# Patient Record
Sex: FEMALE | Race: WHITE | NOT HISPANIC OR LATINO | Employment: FULL TIME | URBAN - NONMETROPOLITAN AREA
[De-identification: names, ages, dates, MRNs, and addresses within clinical notes are randomized per-mention and may not be internally consistent; named-entity substitution may affect disease eponyms.]

---

## 2023-11-08 ENCOUNTER — OFFICE VISIT (OUTPATIENT)
Dept: OBSTETRICS AND GYNECOLOGY | Facility: CLINIC | Age: 23
End: 2023-11-08
Payer: COMMERCIAL

## 2023-11-08 VITALS
HEIGHT: 65 IN | TEMPERATURE: 98 F | SYSTOLIC BLOOD PRESSURE: 110 MMHG | DIASTOLIC BLOOD PRESSURE: 58 MMHG | BODY MASS INDEX: 23.25 KG/M2 | WEIGHT: 139.56 LBS

## 2023-11-08 DIAGNOSIS — O03.9 COMPLETE ABORTION: ICD-10-CM

## 2023-11-08 DIAGNOSIS — Z12.4 CERVICAL CANCER SCREENING: ICD-10-CM

## 2023-11-08 DIAGNOSIS — Z01.419 WELL WOMAN EXAM: Primary | ICD-10-CM

## 2023-11-08 DIAGNOSIS — Z30.9 ENCOUNTER FOR CONTRACEPTIVE MANAGEMENT, UNSPECIFIED TYPE: ICD-10-CM

## 2023-11-08 DIAGNOSIS — N91.5 OLIGOMENORRHEA, UNSPECIFIED TYPE: ICD-10-CM

## 2023-11-08 LAB
B-HCG UR QL: NEGATIVE
CTP QC/QA: YES

## 2023-11-08 PROCEDURE — 1159F PR MEDICATION LIST DOCUMENTED IN MEDICAL RECORD: ICD-10-PCS | Mod: CPTII,,, | Performed by: OBSTETRICS & GYNECOLOGY

## 2023-11-08 PROCEDURE — 3078F PR MOST RECENT DIASTOLIC BLOOD PRESSURE < 80 MM HG: ICD-10-PCS | Mod: CPTII,,, | Performed by: OBSTETRICS & GYNECOLOGY

## 2023-11-08 PROCEDURE — 3078F DIAST BP <80 MM HG: CPT | Mod: CPTII,,, | Performed by: OBSTETRICS & GYNECOLOGY

## 2023-11-08 PROCEDURE — 1159F MED LIST DOCD IN RCRD: CPT | Mod: CPTII,,, | Performed by: OBSTETRICS & GYNECOLOGY

## 2023-11-08 PROCEDURE — 81025 URINE PREGNANCY TEST: CPT | Mod: ,,, | Performed by: OBSTETRICS & GYNECOLOGY

## 2023-11-08 PROCEDURE — 3008F BODY MASS INDEX DOCD: CPT | Mod: CPTII,,, | Performed by: OBSTETRICS & GYNECOLOGY

## 2023-11-08 PROCEDURE — 3008F PR BODY MASS INDEX (BMI) DOCUMENTED: ICD-10-PCS | Mod: CPTII,,, | Performed by: OBSTETRICS & GYNECOLOGY

## 2023-11-08 PROCEDURE — 3074F PR MOST RECENT SYSTOLIC BLOOD PRESSURE < 130 MM HG: ICD-10-PCS | Mod: CPTII,,, | Performed by: OBSTETRICS & GYNECOLOGY

## 2023-11-08 PROCEDURE — 99385 PR PREVENTIVE VISIT,NEW,18-39: ICD-10-PCS | Mod: ,,, | Performed by: OBSTETRICS & GYNECOLOGY

## 2023-11-08 PROCEDURE — 3074F SYST BP LT 130 MM HG: CPT | Mod: CPTII,,, | Performed by: OBSTETRICS & GYNECOLOGY

## 2023-11-08 PROCEDURE — 99385 PREV VISIT NEW AGE 18-39: CPT | Mod: ,,, | Performed by: OBSTETRICS & GYNECOLOGY

## 2023-11-08 PROCEDURE — 81025 POCT URINE PREGNANCY: ICD-10-PCS | Mod: ,,, | Performed by: OBSTETRICS & GYNECOLOGY

## 2023-11-14 LAB — PSYCHE PATHOLOGY RESULT: NORMAL

## 2023-11-15 NOTE — PROGRESS NOTES
Pt notified of unsatisfactory PAP results, mostly blood, c neg GC/CT/TV. Pt advised per KB to come back in for a re-pap in a month. Advised that if on cycle when scheduled to call office to reschedule for after completion of cycle.  Pt verbalized understanding. Transferred to  staff to schedule appt.

## 2024-01-11 ENCOUNTER — TELEPHONE (OUTPATIENT)
Dept: OBSTETRICS AND GYNECOLOGY | Facility: CLINIC | Age: 24
End: 2024-01-11
Payer: COMMERCIAL

## 2024-01-11 NOTE — TELEPHONE ENCOUNTER
----- Message from Natalio Carrasquillo sent at 1/11/2024  1:20 PM CST -----  Contact: montserrat  Patient stated that Dr. Nancy Worley is requesting a records release form. Please fax over too [phone].       Thanks  DD

## 2024-03-04 ENCOUNTER — TELEPHONE (OUTPATIENT)
Dept: OBSTETRICS AND GYNECOLOGY | Facility: CLINIC | Age: 24
End: 2024-03-04
Payer: COMMERCIAL

## 2024-03-04 NOTE — TELEPHONE ENCOUNTER
Called pt in regards to scheduling a pregnancy appointment. No answer. Left a detailed VM stating she will need to call insurance to see if she is covered for maternity coverage as an independent before scheduling. Dr. Cuevas is not accepting self pay or medicaid.   Naheed Jenkins

## 2024-03-04 NOTE — TELEPHONE ENCOUNTER
----- Message from Jennifer Arita sent at 3/4/2024 10:35 AM CST -----  Type:  Patient Returning Call    Who Called:Joi Nicholas,  Who Left Message for Patient: Marie   Does the patient know what this is regarding?:apppt   Would the patient rather a call back or a response via [facility]?    Best Call Back Number:[phone]    Additional Information: returning your call

## 2024-03-04 NOTE — TELEPHONE ENCOUNTER
Called and spoke with patient. Pt adv she does not have maternity coverage as a dependent. Pt is aware but states her dad's company told them she had coverage. I adv her to get with the ins company in regards to the coverage. But she can see Jennifer Lee or Anthony for pregnancy if she gets medicaid.     Pt is aware and wanted to know if she should try getting on her spouse's insurance. I told her she would have to have her spouse call the insurance.   Naheed Jenkins

## 2024-03-04 NOTE — TELEPHONE ENCOUNTER
----- Message from Annmarie Mallory sent at 3/4/2024  8:37 AM CST -----  Patient is calling in regards pregnancy please call her back [phone].          Thanks  luz

## 2024-03-20 ENCOUNTER — TELEPHONE (OUTPATIENT)
Dept: OBSTETRICS AND GYNECOLOGY | Facility: CLINIC | Age: 24
End: 2024-03-20
Payer: COMMERCIAL

## 2024-03-20 NOTE — TELEPHONE ENCOUNTER
I s/w the pt today to [facility] a preg con. Appt. LMP on 02/06/2024. New insurance info given to PAR. Pt will bring her ins card. Sarah Beth Espino

## 2024-03-20 NOTE — TELEPHONE ENCOUNTER
----- Message from Poonam Flower sent at 3/20/2024  4:21 PM CDT -----  Contact: self  Pt needs to schedule INT OB appt pls call [phone] with appt details

## 2024-03-21 DIAGNOSIS — N91.2 AMENORRHEA: Primary | ICD-10-CM

## 2024-04-05 ENCOUNTER — INITIAL PRENATAL (OUTPATIENT)
Dept: OBSTETRICS AND GYNECOLOGY | Facility: CLINIC | Age: 24
End: 2024-04-05
Payer: COMMERCIAL

## 2024-04-05 ENCOUNTER — PROCEDURE VISIT (OUTPATIENT)
Dept: OBSTETRICS AND GYNECOLOGY | Facility: CLINIC | Age: 24
End: 2024-04-05
Payer: COMMERCIAL

## 2024-04-05 VITALS
DIASTOLIC BLOOD PRESSURE: 78 MMHG | HEART RATE: 65 BPM | SYSTOLIC BLOOD PRESSURE: 121 MMHG | BODY MASS INDEX: 26.19 KG/M2 | WEIGHT: 155 LBS

## 2024-04-05 DIAGNOSIS — Z12.4 ENCOUNTER FOR PAPANICOLAOU SMEAR FOR CERVICAL CANCER SCREENING: Primary | ICD-10-CM

## 2024-04-05 DIAGNOSIS — N91.2 AMENORRHEA: ICD-10-CM

## 2024-04-05 DIAGNOSIS — Z34.90 PREGNANCY, UNSPECIFIED GESTATIONAL AGE: ICD-10-CM

## 2024-04-05 PROCEDURE — 76801 OB US < 14 WKS SINGLE FETUS: CPT | Mod: S$GLB,,, | Performed by: OBSTETRICS & GYNECOLOGY

## 2024-04-05 PROCEDURE — 0500F INITIAL PRENATAL CARE VISIT: CPT | Mod: CPTII,S$GLB,, | Performed by: OBSTETRICS & GYNECOLOGY

## 2024-04-05 NOTE — PROGRESS NOTES
Counseled the patient regarding her due date and that we do not change her today based off of later ultrasounds.  Also discussed the importance of taking prenatal vitamins.  Discussed bleeding in pregnancy with intercourse or any exams in pregnancy.  Discussed when to call the office and when to go to labor and delivery.  We discussed the multiple marker screen as well as the other options available for her to determine common aneuploidy as well as foresight carrier screening.  We discussed nausea vomiting symptoms and medications to ameliorate symptoms.  We discussed the plans for ultrasounds throughout the pregnancy.  The patient was given a pregnancy pamphlet and information and all questions were answered   Will do preq/fs

## 2024-04-08 ENCOUNTER — PATIENT MESSAGE (OUTPATIENT)
Dept: OBSTETRICS AND GYNECOLOGY | Facility: CLINIC | Age: 24
End: 2024-04-08
Payer: COMMERCIAL

## 2024-04-08 LAB — Lab: NORMAL

## 2024-04-15 ENCOUNTER — PATIENT MESSAGE (OUTPATIENT)
Dept: OBSTETRICS AND GYNECOLOGY | Facility: CLINIC | Age: 24
End: 2024-04-15
Payer: COMMERCIAL

## 2024-04-15 LAB
AMORPH URATE CRY URNS QL MICRO: NEGATIVE
BACTERIA #/AREA URNS HPF: ABNORMAL /[HPF]
BILIRUB UR QL STRIP: NEGATIVE
CLARITY UR: CLEAR
COLOR UR: YELLOW
EPITHELIAL CELLS: ABNORMAL
GLUCOSE (UA): NEGATIVE MG/DL
KETONES UR QL STRIP: NEGATIVE MG/DL
LEUKOCYTE ESTERASE UR QL STRIP: ABNORMAL
MUCOUS THREADS URNS QL MICRO: ABNORMAL
NITRITE UR QL STRIP: NEGATIVE
OCCULT BLOOD: NEGATIVE
PH, URINE: 5 (ref 5–7.5)
PROT UR QL STRIP: NEGATIVE MG/DL
RBC/HPF: ABNORMAL
REFERENCE LAB TEST: NORMAL
REFERENCE LAB TEST: NORMAL
SP GR UR STRIP: 1.01 (ref 1–1.03)
SPERMATAZOA: ABNORMAL
UROBILINOGEN, URINE: NORMAL E.U./DL (ref 0–1)
WBC/HPF: ABNORMAL

## 2024-04-16 LAB
ABS NRBC COUNT: 0 X 10 3/UL (ref 0–0.01)
ABSOLUTE BASOPHIL: 0.04 X 10 3/UL (ref 0–0.22)
ABSOLUTE EOSINOPHIL: 0.05 X 10 3/UL (ref 0.04–0.54)
ABSOLUTE IMMATURE GRAN: 0.03 X 10 3/UL (ref 0–0.04)
ABSOLUTE LYMPHOCYTE: 1.61 X 10 3/UL (ref 0.86–4.75)
ABSOLUTE MONOCYTE: 0.55 X 10 3/UL (ref 0.22–1.08)
ANTIBODY SCREEN: NEGATIVE
BASOPHILS NFR BLD: 0.4 % (ref 0.2–1.2)
BLOOD GROUPING: NORMAL
BLOOD TYPE (D): POSITIVE
EOSINOPHIL NFR BLD: 0.4 % (ref 0.7–7)
HBV SURFACE AG SERPL QL IA: NONREACTIVE
HCT VFR BLD AUTO: 35.7 % (ref 37–47)
HCV C PCR RNA CONFIRM: NORMAL
HCV IGG SERPL QL IA: NONREACTIVE
HEP BSAG CONFIRMATION: NORMAL
HGB BLD-MCNC: 11.9 G/DL (ref 12–16)
HIV 1+2 AB+HIV1 P24 AG SERPL QL IA: NONREACTIVE
HIV TESTING:: NORMAL
IMMATURE GRANULOCYTES: 0.3 % (ref 0–0.5)
LYMPHOCYTES NFR BLD: 14.4 % (ref 19.3–53.1)
MCH RBC QN AUTO: 31.8 PG (ref 27–32)
MCHC RBC AUTO-ENTMCNC: 33.3 G/DL (ref 32–36)
MCV RBC AUTO: 95.5 FL (ref 82–100)
MONOCYTES NFR BLD: 4.9 % (ref 4.7–12.5)
NEUTROPHILS # BLD AUTO: 8.93 X 10 3/UL (ref 2.15–7.56)
NEUTROPHILS NFR BLD: 79.6 % (ref 34–71.1)
NUCLEATED RED BLOOD CELLS: 0 /100 WBC (ref 0–0.2)
PLATELET # BLD AUTO: 221 X 10 3/UL (ref 135–400)
RBC # BLD AUTO: 3.74 X 10 6/UL (ref 4.2–5.4)
RDW-SD: 43.7 FL (ref 37–54)
RUBELLA IGG SCREEN: NORMAL
SYPHILIS TREPONEMAL ANTIBODY: NONREACTIVE
T4, FREE: 1.19 NG/DL (ref 0.93–1.7)
TSH SERPL DL<=0.005 MIU/L-ACNC: 1.64 UIU/ML (ref 0.27–4.2)
WBC # BLD: 11.21 X 10 3/UL (ref 4.3–10.8)

## 2024-04-23 ENCOUNTER — TELEPHONE (OUTPATIENT)
Dept: OBSTETRICS AND GYNECOLOGY | Facility: CLINIC | Age: 24
End: 2024-04-23
Payer: COMMERCIAL

## 2024-04-29 ENCOUNTER — PATIENT MESSAGE (OUTPATIENT)
Dept: OBSTETRICS AND GYNECOLOGY | Facility: CLINIC | Age: 24
End: 2024-04-29
Payer: COMMERCIAL

## 2024-05-03 ENCOUNTER — ROUTINE PRENATAL (OUTPATIENT)
Dept: OBSTETRICS AND GYNECOLOGY | Facility: CLINIC | Age: 24
End: 2024-05-03
Payer: COMMERCIAL

## 2024-05-03 VITALS
BODY MASS INDEX: 25.69 KG/M2 | SYSTOLIC BLOOD PRESSURE: 108 MMHG | HEART RATE: 70 BPM | DIASTOLIC BLOOD PRESSURE: 67 MMHG | WEIGHT: 152 LBS

## 2024-05-03 DIAGNOSIS — Z36.89 ENCOUNTER FOR ULTRASOUND TO ASSESS FETAL GROWTH: ICD-10-CM

## 2024-05-03 DIAGNOSIS — Z36.89 ENCOUNTER FOR FETAL ANATOMIC SURVEY: Primary | ICD-10-CM

## 2024-05-03 PROCEDURE — 0502F SUBSEQUENT PRENATAL CARE: CPT | Mod: CPTII,S$GLB,, | Performed by: OBSTETRICS & GYNECOLOGY

## 2024-05-28 ENCOUNTER — PATIENT MESSAGE (OUTPATIENT)
Dept: OTHER | Facility: OTHER | Age: 24
End: 2024-05-28
Payer: COMMERCIAL

## 2024-05-31 ENCOUNTER — ROUTINE PRENATAL (OUTPATIENT)
Dept: OBSTETRICS AND GYNECOLOGY | Facility: CLINIC | Age: 24
End: 2024-05-31
Payer: COMMERCIAL

## 2024-05-31 VITALS
BODY MASS INDEX: 26.36 KG/M2 | SYSTOLIC BLOOD PRESSURE: 110 MMHG | HEART RATE: 83 BPM | DIASTOLIC BLOOD PRESSURE: 69 MMHG | WEIGHT: 156 LBS

## 2024-05-31 DIAGNOSIS — Z34.90 PREGNANCY, UNSPECIFIED GESTATIONAL AGE: Primary | ICD-10-CM

## 2024-05-31 DIAGNOSIS — K21.00 GASTROESOPHAGEAL REFLUX DISEASE WITH ESOPHAGITIS WITHOUT HEMORRHAGE: ICD-10-CM

## 2024-05-31 PROCEDURE — 0502F SUBSEQUENT PRENATAL CARE: CPT | Mod: CPTII,S$GLB,, | Performed by: OBSTETRICS & GYNECOLOGY

## 2024-05-31 RX ORDER — PANTOPRAZOLE SODIUM 40 MG/1
40 TABLET, DELAYED RELEASE ORAL DAILY
Qty: 30 TABLET | Refills: 11 | Status: SHIPPED | OUTPATIENT
Start: 2024-05-31 | End: 2024-06-30

## 2024-06-03 ENCOUNTER — PATIENT MESSAGE (OUTPATIENT)
Dept: OBSTETRICS AND GYNECOLOGY | Facility: CLINIC | Age: 24
End: 2024-06-03
Payer: COMMERCIAL

## 2024-06-04 ENCOUNTER — PATIENT MESSAGE (OUTPATIENT)
Dept: OTHER | Facility: OTHER | Age: 24
End: 2024-06-04
Payer: COMMERCIAL

## 2024-06-12 ENCOUNTER — TELEPHONE (OUTPATIENT)
Dept: OBSTETRICS AND GYNECOLOGY | Facility: CLINIC | Age: 24
End: 2024-06-12
Payer: COMMERCIAL

## 2024-06-12 NOTE — TELEPHONE ENCOUNTER
----- Message from Poonam Flower sent at 6/12/2024  9:48 AM CDT -----  Contact: self  Type:  Patient Returning Call    Who Called:Joi Nicholas  Who Left Message for Patient:unsure  Does the patient know what this is regarding?:billing issues-path lab  Would the patient rather a call back or a response via [facility]?   Best Call Back Number:[phone]  Additional Information: n/a

## 2024-06-25 ENCOUNTER — PATIENT MESSAGE (OUTPATIENT)
Dept: OTHER | Facility: OTHER | Age: 24
End: 2024-06-25
Payer: COMMERCIAL

## 2024-07-02 ENCOUNTER — ROUTINE PRENATAL (OUTPATIENT)
Dept: OBSTETRICS AND GYNECOLOGY | Facility: CLINIC | Age: 24
End: 2024-07-02
Payer: COMMERCIAL

## 2024-07-02 ENCOUNTER — PROCEDURE VISIT (OUTPATIENT)
Dept: OBSTETRICS AND GYNECOLOGY | Facility: CLINIC | Age: 24
End: 2024-07-02
Payer: COMMERCIAL

## 2024-07-02 VITALS
HEART RATE: 73 BPM | WEIGHT: 168.19 LBS | DIASTOLIC BLOOD PRESSURE: 81 MMHG | SYSTOLIC BLOOD PRESSURE: 122 MMHG | BODY MASS INDEX: 28.43 KG/M2

## 2024-07-02 DIAGNOSIS — Z36.89 ENCOUNTER FOR FETAL ANATOMIC SURVEY: ICD-10-CM

## 2024-07-02 DIAGNOSIS — O26.879 SHORT CERVIX AFFECTING PREGNANCY: Primary | ICD-10-CM

## 2024-07-02 PROCEDURE — 76805 OB US >/= 14 WKS SNGL FETUS: CPT | Mod: S$GLB,,, | Performed by: OBSTETRICS & GYNECOLOGY

## 2024-07-02 PROCEDURE — 0502F SUBSEQUENT PRENATAL CARE: CPT | Mod: CPTII,S$GLB,, | Performed by: OBSTETRICS & GYNECOLOGY

## 2024-07-02 NOTE — PROGRESS NOTES
Ultrasound today showing shortened cervix after discussion with Maternal-Fetal Medicine the patient's cervix is 1.6 cm in length we will add an vaginal progesterone and we will move towards a cerclage tomorrow

## 2024-07-03 ENCOUNTER — OFFICE VISIT (OUTPATIENT)
Dept: OBSTETRICS AND GYNECOLOGY | Facility: CLINIC | Age: 24
End: 2024-07-03
Payer: COMMERCIAL

## 2024-07-03 ENCOUNTER — PATIENT MESSAGE (OUTPATIENT)
Dept: OBSTETRICS AND GYNECOLOGY | Facility: CLINIC | Age: 24
End: 2024-07-03

## 2024-07-03 ENCOUNTER — OUTSIDE PLACE OF SERVICE (OUTPATIENT)
Dept: OBSTETRICS AND GYNECOLOGY | Facility: CLINIC | Age: 24
End: 2024-07-03

## 2024-07-03 VITALS
HEART RATE: 76 BPM | HEIGHT: 65 IN | SYSTOLIC BLOOD PRESSURE: 121 MMHG | WEIGHT: 168.38 LBS | DIASTOLIC BLOOD PRESSURE: 82 MMHG | BODY MASS INDEX: 28.06 KG/M2

## 2024-07-03 DIAGNOSIS — O26.879 SHORT CERVIX AFFECTING PREGNANCY: Primary | ICD-10-CM

## 2024-07-03 LAB
APPEARANCE, UA: CLEAR
BACTERIA SPEC CULT: ABNORMAL /HPF
BASOPHILS NFR BLD: 0.3 % (ref 0–3)
BILIRUB UR QL STRIP: NEGATIVE MG/DL
COLOR UR: ABNORMAL
EOSINOPHIL NFR BLD: 0.3 % (ref 1–3)
ERYTHROCYTE [DISTWIDTH] IN BLOOD BY AUTOMATED COUNT: 12.8 % (ref 12.5–18)
GLUCOSE (UA): NORMAL MG/DL
HCT VFR BLD AUTO: 36.6 % (ref 37–47)
HGB BLD-MCNC: 12.3 G/DL (ref 12–16)
HGB UR QL STRIP: NEGATIVE /UL
KETONES UR QL STRIP: NEGATIVE MG/DL
LEUKOCYTE ESTERASE UR QL STRIP: 500 /UL
LYMPHOCYTES NFR BLD: 11.6 % (ref 25–40)
MCH RBC QN AUTO: 31.9 PG (ref 27–31.2)
MCHC RBC AUTO-ENTMCNC: 33.6 G/DL (ref 31.8–35.4)
MCV RBC AUTO: 94.8 FL (ref 80–97)
MONOCYTES NFR BLD: 4.5 % (ref 1–15)
NEUTROPHILS # BLD AUTO: 10.05 10*3/UL (ref 1.8–7.7)
NEUTROPHILS NFR BLD: 82.9 % (ref 37–80)
NITRITE UR QL STRIP: NEGATIVE
NUCLEATED RED BLOOD CELLS: 0 %
PH UR STRIP: 7 PH (ref 5–9)
PLATELETS: 189 10*3/UL (ref 142–424)
PROT UR QL STRIP: NEGATIVE MG/DL
RBC # BLD AUTO: 3.86 10*6/UL (ref 4.2–5.4)
RBC #/AREA URNS HPF: ABNORMAL /HPF (ref 0–2)
RPR: NON REACTIVE
SERVICE COMMENT 03: ABNORMAL
SP GR UR STRIP: 1.01 (ref 1–1.03)
SPECIMEN COLLECTION METHOD, URINE: ABNORMAL
SQUAMOUS EPITHELIAL, UA: ABNORMAL /LPF
UROBILINOGEN UR STRIP-ACNC: NORMAL MG/DL
WBC # BLD: 12.1 10*3/UL (ref 4.6–10.2)
WBC #/AREA URNS HPF: ABNORMAL /HPF (ref 0–5)

## 2024-07-03 NOTE — PROGRESS NOTES
History & Physical    SUBJECTIVE:     History of Present Illness:  Patient is a 23 y.o. female presents with preop cerclage  Had 1.6 cm cervix noted on ultrasound for anatomy scan.  HPI   Chief Complaint   Patient presents with    Pre-op Exam       Review of patient's allergies indicates:  No Known Allergies    Current Outpatient Medications   Medication Sig Dispense Refill    prenatal vit/iron fum/folic ac (PRENATAL 1+1 ORAL) Take by mouth.      progesterone micronized (ENDOMETRIN) 100 mg vaginal insert Please convert to 600 mg vaginal suppository daily at bedtime for pregnancy support 30 each 2    pantoprazole (PROTONIX) 40 MG tablet Take 1 tablet (40 mg total) by mouth once daily. 30 tablet 11     No current facility-administered medications for this visit.       History reviewed. No pertinent past medical history.  Past Surgical History:   Procedure Laterality Date    KNEE ARTHROSCOPY W/ ACL RECONSTRUCTION Right 2019    w/ Meniscus repair, ALL repair  (due to Trauma)     Family History   Problem Relation Name Age of Onset    Breast cancer Neg Hx      Colon cancer Neg Hx      Ovarian cancer Neg Hx      Uterine cancer Neg Hx      Cervical cancer Neg Hx       Social History     Tobacco Use    Smoking status: Former     Types: Vaping with nicotine     Start date:      Quit date: 2023     Years since quittin.6     Passive exposure: Current    Smokeless tobacco: Never   Substance Use Topics    Alcohol use: Not Currently    Drug use: Not Currently     Frequency: 7.0 times per week     Types: Marijuana     Comment: Quit 24        Review of Systems:  Review of Systems   Gastrointestinal:  Negative for abdominal pain, bloating, blood in stool, constipation, diarrhea, nausea, vomiting, reflux and fecal incontinence.   Genitourinary:  Negative for bladder incontinence, decreased libido, dysmenorrhea, dyspareunia, dysuria, flank pain, frequency, genital sores, hematuria, hot flashes, menorrhagia, menstrual  "problem, pelvic pain, urgency, vaginal bleeding, vaginal discharge, vaginal pain, urinary incontinence, postcoital bleeding, postmenopausal bleeding, vaginal dryness and vaginal odor.        OBJECTIVE:     Vital Signs (Most Recent)  Pulse: 76 (07/03/24 0847)  BP: 121/82 (07/03/24 0847)  5' 4.5" (1.638 m)  76.4 kg (168 lb 6.4 oz)     Physical Exam:  Physical Exam:   Constitutional: Vital signs are normal. She appears well-developed and well-nourished.               Genitourinary:    Vagina normal.      Pelvic exam was performed with patient supine.     Labial bartholins normal.Cervix is normal. Right adnexum displays no mass, no tenderness and no fullness. Left adnexum displays no mass, no tenderness and no fullness. No erythema, vaginal discharge, tenderness, bleeding, rectocele, cystocele or prolapse of vaginal walls in the vagina.    No foreign body in the vagina.      No signs of injury in the vagina.   Cervix exhibits no motion tenderness, no discharge and no friability. Uterus is enlarged.    Genitourinary Comments: Shortened but not dilated- cervix                        Laboratory      Diagnostic Results:  Indication   ========   Indication: Fetal Anatomic Survey     Pregnancy   =========   [place] pregnancy. Number of fetuses: 1     Dating   ======   Ultrasound examination on: 7/2/2024   GA by U/S based upon: AC, BPD, Femur, HC   GA by U/S 20 w + 6 d   RASHID by U/S: 11/13/2024   Assigned: based on ultrasound (CRL), selected on 04/5/2024   Assigned GA 21 w + 1 d   Assigned RASHID: 11/11/2024     General Evaluation   ==============   Cardiac activity present.  bpm. Presentation: cephalic   Placenta: Placental site: anterior   Umbilical cord: Cord vessels: 3 vessel cord   Amniotic fluid: Amount of AF: normal amount     Fetal Biometry   ============   Standard   BPD 51.2 mm 21w 4d [place]   OFD 60.3 mm 20w 6d [place]   .7 mm 20w 3d [place]   .0 mm 20w 3d Hadlock   Femur 34.4 mm 20w 6d [place]lock "   HC / AC 1.18    g -/- 21% Blayne   EFW (lb) 0 lb   EFW (oz) 13 oz   EFW by: Hadlock (BPD-HC-AC-FL)   Head / Face / Neck   Cephalic index 0.85   Extremities / Bony Struc   FL / BPD 0.67   FL / HC 0.19   FL / AC 0.23   Other Structures    bpm     Fetal Anatomy   ===========   Cranium: appears normal   Lateral ventricles: visualized   Choroid plexus: visualized   Midline falx: visualized   Cerebellum: visualized   Lips: suboptimal   Profile: suboptimal   Nose: suboptimal   4-chamber view: visualized   RVOT view: visualized   LVOT view: visualized   Cord insertion: visualized   Stomach: visualized   Kidneys: visualized   Bladder: visualized   Cervical spine: visualized   Thoracic spine: visualized   Lumbar spine: visualized   Sacral spine: visualized   Arms: both visualized   Legs: both visualized   Fetal sex: female     Maternal Structures   ===============   Uterus / Cervix   Cervical length 16.4 mm     Impression   =========   cx measures 1.66 cm   cerebellum and face appear suboptimal today due to baby's position.   all other anatomy [facility]                                                       Sonographer: Anastasia Cooley   Electronically Signed by: Miriam Cuevas at 07/02/2024-16:36     ASSESSMENT/PLAN:     1. Short cervix affecting pregnancy       PLAN:Plan     Discussed with patient the risks of surgery, including but not limited to, risks of anesthesia, infection, bleeding, injury to other organs, such as skin, nerves, arteries, veins, bowel, bladder, ureters, with possible need for reparative or subsequent surgery such as bowel resection/repair, hernia, colostomy, bladder/ureter surgery, blood transfuion  . Discussed with the Patient the risks/benefits/alternatives of the treatment options.  Also discussed with Maternal-Fetal Medicine they agreed with proceeding with a cerclage  Consents were signed

## 2024-07-10 ENCOUNTER — ROUTINE PRENATAL (OUTPATIENT)
Dept: OBSTETRICS AND GYNECOLOGY | Facility: CLINIC | Age: 24
End: 2024-07-10
Payer: COMMERCIAL

## 2024-07-10 ENCOUNTER — PROCEDURE VISIT (OUTPATIENT)
Dept: OBSTETRICS AND GYNECOLOGY | Facility: CLINIC | Age: 24
End: 2024-07-10
Payer: COMMERCIAL

## 2024-07-10 VITALS
BODY MASS INDEX: 28.46 KG/M2 | HEART RATE: 82 BPM | WEIGHT: 168.38 LBS | SYSTOLIC BLOOD PRESSURE: 129 MMHG | DIASTOLIC BLOOD PRESSURE: 76 MMHG

## 2024-07-10 DIAGNOSIS — Z34.90 PREGNANCY, UNSPECIFIED GESTATIONAL AGE: Primary | ICD-10-CM

## 2024-07-10 DIAGNOSIS — O26.879 SHORT CERVIX AFFECTING PREGNANCY: ICD-10-CM

## 2024-07-10 PROCEDURE — 76815 OB US LIMITED FETUS(S): CPT | Mod: S$GLB,,, | Performed by: OBSTETRICS & GYNECOLOGY

## 2024-07-10 PROCEDURE — 0502F SUBSEQUENT PRENATAL CARE: CPT | Mod: CPTII,S$GLB,, | Performed by: OBSTETRICS & GYNECOLOGY

## 2024-07-10 NOTE — PROGRESS NOTES
The patient is doing well after his clots she denies any cramping bleeding or spotting or contractions her cervical length is not 2.7 cm with no dilation we will continue to do light activity and pelvic rest we will see her back for an ultrasound in 2 weeks precautions were given

## 2024-07-16 ENCOUNTER — PATIENT MESSAGE (OUTPATIENT)
Dept: OBSTETRICS AND GYNECOLOGY | Facility: CLINIC | Age: 24
End: 2024-07-16
Payer: COMMERCIAL

## 2024-07-22 ENCOUNTER — PATIENT MESSAGE (OUTPATIENT)
Dept: OBSTETRICS AND GYNECOLOGY | Facility: CLINIC | Age: 24
End: 2024-07-22
Payer: COMMERCIAL

## 2024-07-24 ENCOUNTER — PATIENT MESSAGE (OUTPATIENT)
Dept: OBSTETRICS AND GYNECOLOGY | Facility: CLINIC | Age: 24
End: 2024-07-24
Payer: COMMERCIAL

## 2024-07-25 ENCOUNTER — PROCEDURE VISIT (OUTPATIENT)
Dept: OBSTETRICS AND GYNECOLOGY | Facility: CLINIC | Age: 24
End: 2024-07-25
Payer: COMMERCIAL

## 2024-07-25 DIAGNOSIS — O26.879 SHORT CERVIX AFFECTING PREGNANCY: ICD-10-CM

## 2024-07-25 PROCEDURE — 76815 OB US LIMITED FETUS(S): CPT | Mod: S$GLB,,, | Performed by: OBSTETRICS & GYNECOLOGY

## 2024-07-25 PROCEDURE — 76817 TRANSVAGINAL US OBSTETRIC: CPT | Mod: S$GLB,,, | Performed by: OBSTETRICS & GYNECOLOGY

## 2024-07-26 ENCOUNTER — TELEPHONE (OUTPATIENT)
Dept: OBSTETRICS AND GYNECOLOGY | Facility: CLINIC | Age: 24
End: 2024-07-26

## 2024-07-26 NOTE — TELEPHONE ENCOUNTER
Spoke with the patient regarding her results of her ultrasound.  She will continue to stay off of her feet she denies any cramping or contractions  labor precautions were given.  Also discuss her results with Maternal-Fetal Medicine who agrees with continued expectant management

## 2024-07-29 ENCOUNTER — PATIENT MESSAGE (OUTPATIENT)
Dept: OBSTETRICS AND GYNECOLOGY | Facility: CLINIC | Age: 24
End: 2024-07-29
Payer: COMMERCIAL

## 2024-08-02 ENCOUNTER — ROUTINE PRENATAL (OUTPATIENT)
Dept: OBSTETRICS AND GYNECOLOGY | Facility: CLINIC | Age: 24
End: 2024-08-02
Payer: COMMERCIAL

## 2024-08-02 ENCOUNTER — PATIENT MESSAGE (OUTPATIENT)
Dept: OBSTETRICS AND GYNECOLOGY | Facility: CLINIC | Age: 24
End: 2024-08-02

## 2024-08-02 ENCOUNTER — PROCEDURE VISIT (OUTPATIENT)
Dept: OBSTETRICS AND GYNECOLOGY | Facility: CLINIC | Age: 24
End: 2024-08-02
Payer: COMMERCIAL

## 2024-08-02 VITALS
WEIGHT: 175 LBS | DIASTOLIC BLOOD PRESSURE: 73 MMHG | SYSTOLIC BLOOD PRESSURE: 125 MMHG | HEART RATE: 86 BPM | BODY MASS INDEX: 29.57 KG/M2

## 2024-08-02 DIAGNOSIS — Z34.90 PREGNANCY, UNSPECIFIED GESTATIONAL AGE: Primary | ICD-10-CM

## 2024-08-02 DIAGNOSIS — O26.879 SHORT CERVIX AFFECTING PREGNANCY: ICD-10-CM

## 2024-08-02 LAB
ABS NRBC COUNT: 0 X 10 3/UL (ref 0–0.01)
ABSOLUTE BASOPHIL: 0.05 X 10 3/UL (ref 0–0.22)
ABSOLUTE EOSINOPHIL: 0.04 X 10 3/UL (ref 0.04–0.54)
ABSOLUTE IMMATURE GRAN: 0.08 X 10 3/UL (ref 0–0.04)
ABSOLUTE LYMPHOCYTE: 1.85 X 10 3/UL (ref 0.86–4.75)
ABSOLUTE MONOCYTE: 0.62 X 10 3/UL (ref 0.22–1.08)
BASOPHILS NFR BLD: 0.4 % (ref 0.2–1.2)
EOSINOPHIL NFR BLD: 0.3 % (ref 0.7–7)
GLUCOSE 1 HR POST 50 GM: 117 MG/DL
HCT VFR BLD AUTO: 35.8 % (ref 37–47)
HGB BLD-MCNC: 11.9 G/DL (ref 12–16)
IMMATURE GRANULOCYTES: 0.6 % (ref 0–0.5)
LYMPHOCYTES NFR BLD: 14.6 % (ref 19.3–53.1)
MCH RBC QN AUTO: 31.6 PG (ref 27–32)
MCHC RBC AUTO-ENTMCNC: 33.2 G/DL (ref 32–36)
MCV RBC AUTO: 95 FL (ref 82–100)
MONOCYTES NFR BLD: 4.9 % (ref 4.7–12.5)
NEUTROPHILS # BLD AUTO: 10.06 X 10 3/UL (ref 2.15–7.56)
NEUTROPHILS NFR BLD: 79.2 % (ref 34–71.1)
NUCLEATED RED BLOOD CELLS: 0 /100 WBC (ref 0–0.2)
PLATELET # BLD AUTO: 179 X 10 3/UL (ref 135–400)
RBC # BLD AUTO: 3.77 X 10 6/UL (ref 4.2–5.4)
RDW-SD: 45.1 FL (ref 37–54)
WBC # BLD: 12.7 X 10 3/UL (ref 4.3–10.8)

## 2024-08-02 PROCEDURE — 76815 OB US LIMITED FETUS(S): CPT | Mod: S$GLB,,, | Performed by: OBSTETRICS & GYNECOLOGY

## 2024-08-02 PROCEDURE — 76817 TRANSVAGINAL US OBSTETRIC: CPT | Mod: S$GLB,,, | Performed by: OBSTETRICS & GYNECOLOGY

## 2024-08-16 ENCOUNTER — ROUTINE PRENATAL (OUTPATIENT)
Dept: OBSTETRICS AND GYNECOLOGY | Facility: CLINIC | Age: 24
End: 2024-08-16
Payer: COMMERCIAL

## 2024-08-16 VITALS
DIASTOLIC BLOOD PRESSURE: 77 MMHG | HEART RATE: 87 BPM | BODY MASS INDEX: 30.22 KG/M2 | WEIGHT: 178.81 LBS | SYSTOLIC BLOOD PRESSURE: 113 MMHG

## 2024-08-16 DIAGNOSIS — Z34.90 PREGNANCY, UNSPECIFIED GESTATIONAL AGE: Primary | ICD-10-CM

## 2024-08-16 NOTE — PROGRESS NOTES
No contractions   Patient was given instructions on labor precautions - go to [place] with any contractions 2-3 min apart for two hours, rupture of membranes or decreased fetal movement  Kick counts 10 movements in 2 hours

## 2024-09-06 ENCOUNTER — ROUTINE PRENATAL (OUTPATIENT)
Dept: OBSTETRICS AND GYNECOLOGY | Facility: CLINIC | Age: 24
End: 2024-09-06
Payer: COMMERCIAL

## 2024-09-06 ENCOUNTER — PROCEDURE VISIT (OUTPATIENT)
Dept: OBSTETRICS AND GYNECOLOGY | Facility: CLINIC | Age: 24
End: 2024-09-06
Payer: COMMERCIAL

## 2024-09-06 VITALS
DIASTOLIC BLOOD PRESSURE: 76 MMHG | SYSTOLIC BLOOD PRESSURE: 112 MMHG | WEIGHT: 184 LBS | BODY MASS INDEX: 31.1 KG/M2 | HEART RATE: 78 BPM

## 2024-09-06 DIAGNOSIS — Z36.89 ENCOUNTER FOR ULTRASOUND TO ASSESS FETAL GROWTH: ICD-10-CM

## 2024-09-06 DIAGNOSIS — Z23 ENCOUNTER FOR ADMINISTRATION OF VACCINE: ICD-10-CM

## 2024-09-06 DIAGNOSIS — Z34.90 PREGNANCY, UNSPECIFIED GESTATIONAL AGE: Primary | ICD-10-CM

## 2024-09-06 NOTE — PROGRESS NOTES
Consent done and tdap  Patient was given instructions on labor precautions - go to [place] with any contractions 2-3 min apart for two hours, rupture of membranes or decreased fetal movement  Kick counts 10 movements in 2 hours

## 2024-09-14 LAB
APPEARANCE, UA: CLEAR
BACTERIA SPEC CULT: ABNORMAL /HPF
BILIRUB UR QL STRIP: NEGATIVE MG/DL
COLOR UR: ABNORMAL
GLUCOSE (UA): NORMAL MG/DL
HGB UR QL STRIP: NEGATIVE /UL
KETONES UR QL STRIP: NEGATIVE MG/DL
LEUKOCYTE ESTERASE UR QL STRIP: 500 /UL
MUCUS URINE: ABNORMAL /LPF
NITRITE UR QL STRIP: NEGATIVE
PH UR STRIP: 7 PH (ref 5–9)
PROT UR QL STRIP: NEGATIVE MG/DL
RBC #/AREA URNS HPF: ABNORMAL /HPF (ref 0–2)
SERVICE COMMENT 03: ABNORMAL
SP GR UR STRIP: 1 (ref 1–1.03)
SPECIMEN COLLECTION METHOD, URINE: ABNORMAL
SQUAMOUS EPITHELIAL, UA: ABNORMAL /LPF
UROBILINOGEN UR STRIP-ACNC: NORMAL MG/DL
WBC #/AREA URNS HPF: ABNORMAL /HPF (ref 0–5)

## 2024-09-15 ENCOUNTER — OUTSIDE PLACE OF SERVICE (OUTPATIENT)
Dept: OBSTETRICS AND GYNECOLOGY | Facility: CLINIC | Age: 24
End: 2024-09-15
Payer: COMMERCIAL

## 2024-09-16 ENCOUNTER — OUTSIDE PLACE OF SERVICE (OUTPATIENT)
Dept: OBSTETRICS AND GYNECOLOGY | Facility: CLINIC | Age: 24
End: 2024-09-16
Payer: COMMERCIAL

## 2024-09-20 ENCOUNTER — TELEPHONE (OUTPATIENT)
Dept: OBSTETRICS AND GYNECOLOGY | Facility: CLINIC | Age: 24
End: 2024-09-20

## 2024-09-23 ENCOUNTER — TELEPHONE (OUTPATIENT)
Dept: OBSTETRICS AND GYNECOLOGY | Facility: CLINIC | Age: 24
End: 2024-09-23
Payer: COMMERCIAL

## 2024-09-23 NOTE — TELEPHONE ENCOUNTER
Called and checked on patient and baby after c-sec. They are doing great. Scheduled patient for her 2 wk postpartum visit and advised to call if they need anything before.

## 2024-10-03 ENCOUNTER — POSTPARTUM VISIT (OUTPATIENT)
Dept: OBSTETRICS AND GYNECOLOGY | Facility: CLINIC | Age: 24
End: 2024-10-03
Payer: COMMERCIAL

## 2024-10-03 VITALS
BODY MASS INDEX: 29.61 KG/M2 | WEIGHT: 175.19 LBS | SYSTOLIC BLOOD PRESSURE: 114 MMHG | DIASTOLIC BLOOD PRESSURE: 73 MMHG | HEART RATE: 107 BPM

## 2024-10-03 PROCEDURE — 0503F POSTPARTUM CARE VISIT: CPT | Mod: CPTII,,, | Performed by: OBSTETRICS & GYNECOLOGY

## 2024-10-03 NOTE — PROGRESS NOTES
Subjective     Patient ID: Joi Nicholas is a 24 y.o. female.    Chief Complaint:  Postpartum Care      History of Present Illness  HPI  CC: Post-partum follow-up    Joi Nicholas is a 24 y.o. female  who presents for post-partum visit.  She is S/P a , due to fetal distress  .  She and the baby are doing well.  No pain.  No fever.   No bowel / bladder complaints.    Delivery Date: 2024  Delivery MD: humberto  Gender: female  Birth Weight: 3 pounds 7 ounces  Breast Feeding: YES  Depression: NO  Contraception: condoms    Pregnancy was complicated by:   labor, cervical incompetence           GYN & OB History  Patient's last menstrual period was 2024 (exact date).   Date of Last Pap: 2024    OB History    Para Term  AB Living   2 1   1 1 1   SAB IAB Ectopic Multiple Live Births   1     0 1      # Outcome Date GA Lbr Jeramy/2nd Weight Sex Type Anes PTL Lv   2  24 31w6d  1.559 kg (3 lb 7 oz) F CS-LTranv   FRANCISCO   1 SAB 23 4w0d    SAB   FD       Review of Systems  Review of Systems   Constitutional:  Negative for activity change, appetite change, fatigue and unexpected weight change.   Genitourinary:  Negative for bladder incontinence, pelvic pain, vaginal bleeding, vaginal discharge, urinary incontinence, vaginal dryness and vaginal odor.   Integumentary:  Negative for breast mass, nipple discharge, breast skin changes and breast tenderness.   Psychiatric/Behavioral:  Negative for depression and sleep disturbance. The patient is not nervous/anxious.    Breast: Negative for asymmetry, breast self exam, lump, mass, mastodynia, nipple discharge, skin changes and tenderness         Objective   Physical Exam:             Abdominal: Soft. Bowel sounds are normal.                  Skin:   INC clean dry and intact             Assessment and Plan     1. Postpartum care and examination immediately after delivery            Plan:  Postpartum care and  examination immediately after delivery      We discussed the future pregnancies in the need for likely vaginal progesterone also the potential for a prophylactic cerclage the fact that she will need to call me early with finding of the she was pregnant and would need to be seen early in likely to be also seen by Maternal-Fetal Medicine.  We also discussed the importance of contraception and waiting for pregnancy due to the fact that close intra pregnancy intervals would put her at risk for  labor

## 2025-06-26 NOTE — PROGRESS NOTES
Chief Complaint:   New patient     History of Present Illness:  Joi Nicholas is a 23 y.o. year old  presents for her well woman exam. C/o missed cycle. LMP 10/4/23. Home UPT 23, positive. Prior to this, having regular monthly cycles. Sexually active. Reports bleeding after intercourse last night along with cramping, denies past episodes.       LMP: 10/4/23  Frequency: Monthly   Cycle length: 5-6 days   Flow: heavy  Intermenstrual bleeding: No  Postcoital bleeding: Yes, last night only  Dysmenorrhea: Yes, Mod.  Sexually active: Yes   Dyspareunia: No  Contraception: None   H/o STI: No   Last pap:  - Normal per pt.  H/o abnl pap: No   Colposcopy: N/A  Gardasil: 3/3  MMG: n/a  H/o abnl MMG: n/a  Colonoscopy: n/a      Review of Systems:  General/Constitutional: Chills denies. Fatigue/weakness denies. Fever denies. Night sweats denies. Hot flashes denies.  Gastrointestinal: Abdominal pain denies. Blood in stool denies. Constipation denies. Diarrhea denies. Heartburn denies. Nausea denies. Vomiting denies.   Genitourinary: Incontinence denies. Blood in urine denies. Frequent urination denies. Urgency denies. Painful urination denies. Nocturia denies.  Gynecologic: Irregular menses denies. Heavy bleeding denies. Painful menses denies. Vaginal discharge denies. Vaginal odor denies. Vaginal itching/Irritation denies. Vaginal lesion denies.  Pelvic pain denies. Decreased libido denies. Vulvar lesion denies. Prolapse of genital organs denies. Painful intercourse denies. Postcoital bleeding admits.   Psychiatric: Mood lability denies. Depressed mood denies. Suicidal thoughts denies. Anxiety denies. Overwhelmed denies. Appetite normal. Energy level normal.    OB History    Para Term  AB Living   1 0 0 0 1 0   SAB IAB Ectopic Multiple Live Births   1 0 0 0 0      # 1 - Date: 23, Sex: None, Weight: None, GA: 4w0d, Delivery: Spontaneous , Apgar1: None, Apgar5: None, Living: Fetal Demise,  PAST SURGICAL HISTORY:  History of cholecystectomy     History of sleeve gastrectomy      "Birth Comments: None      History reviewed. No pertinent past medical history.  Past Surgical History:   Procedure Laterality Date    KNEE ARTHROSCOPY W/ ACL RECONSTRUCTION Right 2019    w/ Meniscus repair, ALL repair  (due to Trauma)      No current outpatient medications on file.    Review of patient's allergies indicates:  No Known Allergies    Social History     Socioeconomic History    Marital status: Significant Other   Tobacco Use    Smoking status: Former     Types: Vaping with nicotine     Start date: 2019     Quit date: 11/5/2023     Passive exposure: Current    Smokeless tobacco: Never   Substance and Sexual Activity    Alcohol use: Yes    Drug use: Yes     Frequency: 7.0 times per week     Types: Marijuana     Comment: Quit 11/5/23    Sexual activity: Yes     Partners: Male     Birth control/protection: None     Comment: STI: None     Family History   Problem Relation Age of Onset    Breast cancer Neg Hx     Colon cancer Neg Hx     Ovarian cancer Neg Hx     Uterine cancer Neg Hx     Cervical cancer Neg Hx          Physical Exam:  BP (!) 110/58 (BP Location: Right arm, Patient Position: Sitting, BP Method: Medium (Manual))   Temp 98.2 °F (36.8 °C) (Temporal)   Ht 5' 4.5" (1.638 m)   Wt 63.3 kg (139 lb 8.8 oz)   LMP 10/04/2023   BMI 23.58 kg/m²       Chaperone: present.       General appearance: healthy, well-nourished and well-developed     Psychiatric: Orientation to time, place and person. Active, alert, sad mood    Skin:   Appearance: no rashes or lesions.     Neck:   Neck: supple, FROM, trachea midline. and no masses   Thyroid: no enlargement or nodules and non-tender.       Cardiovascular:   Auscultation: RRR and no murmur.   Peripheral Vascular: no varicosities, LLE edema, RLE edema, calf tenderness, and palpable cord and pedal pulses intact.     Lungs:   Respiratory effort: no intercostal retractions or accessory muscle usage.   Auscultation: no wheezing, rales/crackles, or rhonchi and clear " to auscultation.     Breast:   Inspection/Palpation: no tenderness, discrete/distinct masses, skin changes, or abnormal secretions. Nipple appearance normal.     Abdomen:   Auscultation/Inspection/Palpation: no hepatomegaly, splenomegaly, masses, tenderness or CVA tenderness and soft, non-distended bowel sounds preset.    Hernia: no palpable hernias.     Female Genitalia:   Vulva: no masses, tenderness or lesions   Bladder/Urethra: no urethral discharge or mass, normal meatus, bladder non-distended.   Vagina: no tenderness, erythema, cystocele, rectocele, abnormal vaginal discharge or vesicle(s) or ulcers   Cervix: no discharge, no cervical lacerations noted or motion tenderness and grossly normal   Uterus: normal size and shape and midline, non-tender, and no uterine prolapse.   Adnexa/Parametria: no parametrial tenderness or mass, no adnexal tenderness or ovarian mass.     Lymph Nodes:   Palpation: non tender submandibular nodes, axillary nodes, or inguinal nodes.     Rectal Exam:   Rectum: normal perianal skin.        Assessment/Plan:  1. Well woman exam  Pap gc/cz/tv  Advised patient if she notices any changes to her breasts, including a lump, mass, dimpling, discharge, rash or tenderness, to should contact us immediately   Healthy diet, exercise   Multivitamin   Seat belt   Sunscreen use   Safe sex/ STI education   Contraception evaluation: declines   Gardasil evaluation: 3/3    2.  complete   Educated  2 previous +UPT at home   UPT negative today  Started bleeding today    3. Contraceptive management  Discussed with patient contraceptive options including natural family planning, barrier, oral contraceptives, patch, NuvaRing, Depo-Provera, Nexplanon, IUDs, abstinence.       Safe sex education     Discussed with patient that birth control options discussed above do not protect against STDs.   Patient declines    PNV